# Patient Record
Sex: MALE | Race: WHITE | NOT HISPANIC OR LATINO | Employment: FULL TIME | URBAN - METROPOLITAN AREA
[De-identification: names, ages, dates, MRNs, and addresses within clinical notes are randomized per-mention and may not be internally consistent; named-entity substitution may affect disease eponyms.]

---

## 2022-01-21 ENCOUNTER — OFFICE VISIT (OUTPATIENT)
Dept: PRIMARY CARE CLINIC | Facility: CLINIC | Age: 42
End: 2022-01-21
Payer: COMMERCIAL

## 2022-01-21 VITALS
OXYGEN SATURATION: 98 % | BODY MASS INDEX: 22.51 KG/M2 | WEIGHT: 148.5 LBS | DIASTOLIC BLOOD PRESSURE: 68 MMHG | HEART RATE: 87 BPM | SYSTOLIC BLOOD PRESSURE: 118 MMHG | HEIGHT: 68 IN | RESPIRATION RATE: 18 BRPM | TEMPERATURE: 98 F

## 2022-01-21 DIAGNOSIS — Z11.4 ENCOUNTER FOR SCREENING FOR HIV: ICD-10-CM

## 2022-01-21 DIAGNOSIS — G89.29 CHRONIC RIGHT SHOULDER PAIN: ICD-10-CM

## 2022-01-21 DIAGNOSIS — Z76.89 ENCOUNTER TO ESTABLISH CARE: Primary | ICD-10-CM

## 2022-01-21 DIAGNOSIS — M25.511 CHRONIC RIGHT SHOULDER PAIN: ICD-10-CM

## 2022-01-21 DIAGNOSIS — Z13.6 ENCOUNTER FOR SCREENING FOR CARDIOVASCULAR DISORDERS: ICD-10-CM

## 2022-01-21 DIAGNOSIS — Z11.59 NEED FOR HEPATITIS C SCREENING TEST: ICD-10-CM

## 2022-01-21 PROCEDURE — 99999 PR PBB SHADOW E&M-NEW PATIENT-LVL IV: ICD-10-PCS | Mod: PBBFAC,,, | Performed by: STUDENT IN AN ORGANIZED HEALTH CARE EDUCATION/TRAINING PROGRAM

## 2022-01-21 PROCEDURE — 99999 PR PBB SHADOW E&M-NEW PATIENT-LVL IV: CPT | Mod: PBBFAC,,, | Performed by: STUDENT IN AN ORGANIZED HEALTH CARE EDUCATION/TRAINING PROGRAM

## 2022-01-21 PROCEDURE — 99214 OFFICE O/P EST MOD 30 MIN: CPT | Mod: S$GLB,,, | Performed by: STUDENT IN AN ORGANIZED HEALTH CARE EDUCATION/TRAINING PROGRAM

## 2022-01-21 PROCEDURE — 99214 PR OFFICE/OUTPT VISIT, EST, LEVL IV, 30-39 MIN: ICD-10-PCS | Mod: S$GLB,,, | Performed by: STUDENT IN AN ORGANIZED HEALTH CARE EDUCATION/TRAINING PROGRAM

## 2022-01-21 RX ORDER — DICLOFENAC SODIUM 10 MG/G
2 GEL TOPICAL 4 TIMES DAILY
Qty: 100 G | Refills: 5 | Status: SHIPPED | OUTPATIENT
Start: 2022-01-21 | End: 2023-10-12

## 2022-01-21 NOTE — PATIENT INSTRUCTIONS
"Patient Education       Shoulder Pain Discharge Instructions   About this topic   Your shoulder joint is made of 3 bones. These are the upper arm bone, the shoulder blade, and the collarbone. The shoulder is a "ball and socket" joint. The "ball" part of the joint is the top part of your upper arm bone. The "socket" part of your joint is a cup shaped indentation in your shoulder blade. Because of this, the shoulder can move in many ways. Strong bands of tissue called ligaments help hold the shoulder in place. Muscles and tendons also hold it in place.  You can have pain in your shoulder for many reasons. It may be hard for the doctor to tell exactly where the pain is coming from. You can have pain in your muscles, bones, or joints. It can also happen in your tendons and ligaments which connect these together.  Causes of this kind of pain may include:  · Overuse or using muscles in the same way over and over  · Trauma from falls, accidents, direct blows to muscles, and injuries such as bone breaks, sprains, or dislocations  · Strain on your muscles from bad posture           What care is needed at home?   · Ask your doctor what you need to do when you go home. Make sure you ask questions if you do not understand what the doctor says. This way you will know what you need to do.  · Rest. Allow your injury to heal before you do slow movements.  · Place an ice pack or a bag of frozen peas wrapped in a towel over the painful part. Never put ice right on the skin. Do not leave the ice on more than 10 to 15 minutes at a time.  · Prop your arm on pillows to help with swelling.  · Your doctor may want you to use a sling, strap, or sleeve to keep your shoulder from moving.  · Heat may be used but not right after an injury. Heat can make swelling worse. If your doctor tells you to use heat, put a heating pad on your shoulder for no more than 20 minutes at a time. Never go to sleep with a heating pad on as this can cause " burns.  · Do range of motion exercises as your therapist or doctor teaches you to do. As your shoulder heals, you will be given more exercises to stretch and strengthen your shoulder.  What follow-up care is needed?   · Your doctor may ask you to make visits to the office to check on your progress. Be sure to keep all these visits.  · Your doctor may send you to physical therapy or occupational therapy to help you regain use of your shoulder sooner.  What drugs may be needed?   The doctor may order drugs to:  · Help with pain and swelling  The doctor may give you a shot of an anti-inflammatory drug called a corticosteroid. This will help with swelling. Talk with your doctor about the risks of this shot.  Will physical activity be limited?   Your doctor may ask you to rest and limit your activity. Based on how bad your shoulder injury is, this could last for a few days to a number of weeks.  What can be done to prevent this health problem?   · Stay active and work out to keep your muscles strong and flexible.  · Warm up slowly and stretch your muscles before you work out. Do not work out if you are overly tired. Take extra care if working out in cold weather.  · Slowly increase the amount of time you work out. If you are using weights, slowly increase the weight to strengthen your muscles.  · Wear protection when playing sports.  · Take breaks often when doing things that use repeat movements.  When do I need to call the doctor?   · Pain or swelling gets worse  · Hand feels cold or numb  · You are not feeling better in 2 or 3 days or you are feeling worse  Teach Back: Helping You Understand   The Teach Back Method helps you understand the information we are giving you. After you talk with the staff, tell them in your own words what you learned. This helps to make sure the staff has described each thing clearly. It also helps to explain things that may have been confusing. Before going home, make sure you can do  these:  · I can tell you about my condition.  · I can tell you what may help ease my pain.  · I can tell you what I will do if I have more pain or swelling or my fingers are cool or blue.  Where can I learn more?   [employer]  http://orthoinfo.aaos.org/PDFs/C65298.pdf   Last Reviewed Date   2020-09-25  Consumer Information Use and Disclaimer   This information is not specific medical advice and does not replace information you receive from your health care provider. This is only a brief summary of general information. It does NOT include all information about conditions, illnesses, injuries, tests, procedures, treatments, therapies, discharge instructions or life-style choices that may apply to you. You must talk with your health care provider for complete information about your health and treatment options. This information should not be used to decide whether or not to accept your health care providers advice, instructions or recommendations. Only your health care provider has the knowledge and training to provide advice that is right for you.  Copyright   Copyright © 2021 [employer], [employer] and its affiliates and/or licensors. All rights reserved.

## 2022-01-21 NOTE — PROGRESS NOTES
Subjective:       Patient ID: Chris Bell is a 41 y.o. male.    Chief Complaint: Establish Care    HPI:  41 y.o. male presents to [facility] to establish care.    Acute concerns?: Right shoulder pain last 5-6 months. Pain waxes and wanes in intensity. Had some relief when off of work. Can depend on what he eats. Is a  and feels could have contributed. Onset was gradual. Pain is described as feeling internal. When lifting arm to a certain point pain can worsen. No limited mobility, just pain when moves past a certain point. Aching in nature. Tried voltaren with some relief. Hot shower will improve temporarily. Lifting heavy items can worsen. Limited to shoulder, doesn't radiate. Never had pain like this before. No past injury or surgery. First time being seen for this pain. Tried CBD oil which did help.    No history of MI, renal disease, or stomach ulcers    Last PCP?: Never had  Allergies: NKDA  Medical History: None  Medications: MVI  Surgical History: None   Family History: Father with prostatectomy for prostate cancer; no known autoimmune disease  Social History: Quit years ago, EtOH use on weekend 6 beers on a Saturday, no illicits    Fasting?: Yes  Hep C screening?: Amenable  HIV screening?: Amenable  COVID vaccine?: Didn't get it, not interested at this time, wants to get after 100% healthy, did have COVID in past  Last tetanus vaccine?: Doesn't recall, not today  Flu vaccine?: Not interested    Review of Systems   Constitutional: Negative for chills, diaphoresis, fatigue and fever.   HENT: Negative for congestion, sinus pressure, sneezing and sore throat.    Respiratory: Negative for cough and shortness of breath.    Cardiovascular: Negative for chest pain and palpitations.   Gastrointestinal: Negative for abdominal pain, diarrhea, nausea and vomiting.   Musculoskeletal: Positive for arthralgias (right shoulder). Negative for joint swelling and myalgias.   Skin: Negative for rash and wound.     "    Dry skin   Neurological: Negative for dizziness, weakness, numbness and headaches.       Objective:      Vitals:    01/21/22 0816   BP: 118/68   BP Location: Left arm   Patient Position: Sitting   BP Method: Medium (Manual)   Pulse: 87   Resp: 18   Temp: 98.1 °F (36.7 °C)   TempSrc: Oral   SpO2: 98%   Weight: 67.4 kg (148 lb 7.7 oz)   Height: 5' 8" (1.727 m)     Physical Exam  Vitals reviewed.   Constitutional:       General: He is not in acute distress.     Appearance: Normal appearance. He is not ill-appearing.   HENT:      Head: Normocephalic and atraumatic.   Eyes:      General:         Right eye: No discharge.         Left eye: No discharge.      Conjunctiva/sclera: Conjunctivae normal.   Cardiovascular:      Rate and Rhythm: Normal rate and regular rhythm.      Pulses: Normal pulses.      Heart sounds: Normal heart sounds.   Pulmonary:      Effort: Pulmonary effort is normal.      Breath sounds: Normal breath sounds.   Abdominal:      General: Abdomen is flat. Bowel sounds are normal. There is no distension.      Palpations: Abdomen is soft. There is no mass.      Tenderness: There is no abdominal tenderness. There is no guarding or rebound.   Musculoskeletal:         General: No deformity.   Skin:     General: Skin is warm and dry.      Coloration: Skin is not jaundiced.   Neurological:      General: No focal deficit present.      Mental Status: He is alert and oriented to person, place, and time.   Psychiatric:         Mood and Affect: Mood normal.         Behavior: Behavior normal.             No results found for: NA, K, CL, CO2, BUN, CREATININE, GLUCOSE, ANIONGAP  No results found for: HGBA1C  No results found for: BNP, BNPTRIAGEBLO    No results found for: WBC, HGB, HCT, PLT, GRAN  No results found for: CHOL, HDL, LDLCALC, TRIG       Current Outpatient Medications:     diclofenac sodium (VOLTAREN) 1 % Gel, Apply 2 g topically 4 (four) times daily., Disp: 100 g, Rfl: 5        Assessment:       1. " Encounter to establish care    2. Chronic right shoulder pain    3. Encounter for screening for cardiovascular disorders    4. Encounter for screening for HIV    5. Need for hepatitis C screening test           Plan:       Encounter to establish care    Chronic right shoulder pain  -     X-ray Shoulder 2 or More Views Right; Future; Expected date: 01/21/2022  -     Ambulatory referral/consult to Orthopedics; Future; Expected date: 01/28/2022  -     diclofenac sodium (VOLTAREN) 1 % Gel; Apply 2 g topically 4 (four) times daily.  Dispense: 100 g; Refill: 5  - Conservative care recommended, patient declines PT at this time and would like to wait on imaging results and to talk with Orthopaedics  - Amenable to discussing potential treatment options with Orthopaedics at this time following X-ray  - Will contact clinic if pain worsens or if desires PT referral in future  - Suspect deltoid involvement with pain worst with abduction past 90 degrees  - Will take ibuprofen OTC when needed    Encounter for screening for cardiovascular disorders  -     Lipid Panel; Future; Expected date: 01/21/2022    Encounter for screening for HIV  -     HIV 1/2 Ag/Ab (4th Gen); Future; Expected date: 01/21/2022    Need for hepatitis C screening test  -     Hepatitis C Antibody; Future; Expected date: 01/21/2022    RTC in 1 year  Contact clinic if further intervention on shoulder desired

## 2022-03-02 ENCOUNTER — TELEPHONE (OUTPATIENT)
Dept: ORTHOPEDICS | Facility: CLINIC | Age: 42
End: 2022-03-02
Payer: COMMERCIAL

## 2022-03-03 ENCOUNTER — TELEPHONE (OUTPATIENT)
Dept: ORTHOPEDICS | Facility: CLINIC | Age: 42
End: 2022-03-03
Payer: COMMERCIAL

## 2022-03-03 NOTE — TELEPHONE ENCOUNTER
----- Message from Johnny Hopkins sent at 3/3/2022  9:48 AM CST -----  Contact: Patient  Patient missed call. Requesting call back      [phone] ([place])

## 2022-03-03 NOTE — TELEPHONE ENCOUNTER
Spoke with pt. appt rescheduled due to Dr Abbasi being out of clinic. All questions answered. Pt verbalized understanding.

## 2023-10-12 ENCOUNTER — OFFICE VISIT (OUTPATIENT)
Dept: PRIMARY CARE CLINIC | Facility: CLINIC | Age: 43
End: 2023-10-12
Payer: COMMERCIAL

## 2023-10-12 VITALS
RESPIRATION RATE: 18 BRPM | HEIGHT: 68 IN | SYSTOLIC BLOOD PRESSURE: 118 MMHG | DIASTOLIC BLOOD PRESSURE: 80 MMHG | TEMPERATURE: 98 F | OXYGEN SATURATION: 100 % | HEART RATE: 90 BPM | WEIGHT: 150.25 LBS | BODY MASS INDEX: 22.77 KG/M2

## 2023-10-12 DIAGNOSIS — Z13.6 ENCOUNTER FOR SCREENING FOR CARDIOVASCULAR DISORDERS: ICD-10-CM

## 2023-10-12 DIAGNOSIS — M54.50 INTERMITTENT LOW BACK PAIN: ICD-10-CM

## 2023-10-12 DIAGNOSIS — Z51.81 MEDICATION MONITORING ENCOUNTER: ICD-10-CM

## 2023-10-12 DIAGNOSIS — J32.9 SINUSITIS, UNSPECIFIED CHRONICITY, UNSPECIFIED LOCATION: ICD-10-CM

## 2023-10-12 DIAGNOSIS — Z00.00 ANNUAL PHYSICAL EXAM: Primary | ICD-10-CM

## 2023-10-12 PROCEDURE — 99396 PREV VISIT EST AGE 40-64: CPT | Mod: S$GLB,,, | Performed by: STUDENT IN AN ORGANIZED HEALTH CARE EDUCATION/TRAINING PROGRAM

## 2023-10-12 PROCEDURE — 99396 PR PREVENTIVE VISIT,EST,40-64: ICD-10-PCS | Mod: S$GLB,,, | Performed by: STUDENT IN AN ORGANIZED HEALTH CARE EDUCATION/TRAINING PROGRAM

## 2023-10-12 PROCEDURE — 99999 PR PBB SHADOW E&M-EST. PATIENT-LVL IV: CPT | Mod: PBBFAC,,, | Performed by: STUDENT IN AN ORGANIZED HEALTH CARE EDUCATION/TRAINING PROGRAM

## 2023-10-12 PROCEDURE — 99999 PR PBB SHADOW E&M-EST. PATIENT-LVL IV: ICD-10-PCS | Mod: PBBFAC,,, | Performed by: STUDENT IN AN ORGANIZED HEALTH CARE EDUCATION/TRAINING PROGRAM

## 2023-10-12 RX ORDER — DOXYCYCLINE 100 MG/1
100 CAPSULE ORAL EVERY 12 HOURS
Qty: 20 CAPSULE | Refills: 0 | Status: SHIPPED | OUTPATIENT
Start: 2023-10-12 | End: 2023-10-22

## 2023-10-12 RX ORDER — FLUTICASONE PROPIONATE 50 MCG
2 SPRAY, SUSPENSION (ML) NASAL DAILY
Qty: 15.8 ML | Refills: 5 | Status: SHIPPED | OUTPATIENT
Start: 2023-10-12

## 2023-10-12 NOTE — PROGRESS NOTES
"Subjective:       Patient ID: Chris Bell is a 42 y.o. male.    Chief Complaint: Annual Exam    HPI:  42 y.o. male presents to [facility] for annual exam    Acute concerns?: patient reports had COVID 3 weeks ago and has been having sinus congestion with drainage since having infection. Was a home test. Is taking Zyrtec at this time which seems to be helping so.    Review of Systems   Constitutional:  Negative for chills, diaphoresis, fatigue and fever.   HENT:  Positive for congestion, postnasal drip and sinus pressure. Negative for sinus pain.    Respiratory:  Positive for cough (Mild). Negative for shortness of breath.    Cardiovascular:  Negative for chest pain and palpitations.   Gastrointestinal:  Negative for abdominal pain, diarrhea, nausea and vomiting.   Musculoskeletal:  Positive for back pain (Chronic low back pain intermittently. Not today. Declines intervention). Negative for myalgias.   Skin:  Negative for rash and wound.   Neurological:  Negative for dizziness, light-headedness and headaches.       Objective:      Vitals:    10/12/23 0825   BP: 118/80   BP Location: Left arm   Patient Position: Sitting   BP Method: Medium (Manual)   Pulse: 90   Resp: 18   Temp: 97.7 °F (36.5 °C)   TempSrc: Temporal   SpO2: 100%   Weight: 68.2 kg (150 lb 3.9 oz)   Height: 5' 8" (1.727 m)     Physical Exam  Vitals reviewed.   Constitutional:       General: He is not in acute distress.     Appearance: Normal appearance. He is not ill-appearing.   HENT:      Head: Normocephalic and atraumatic.      Nose:      Right Sinus: No maxillary sinus tenderness or frontal sinus tenderness.      Left Sinus: No maxillary sinus tenderness or frontal sinus tenderness.      Mouth/Throat:      Mouth: Mucous membranes are moist.      Pharynx: Oropharynx is clear. No oropharyngeal exudate or posterior oropharyngeal erythema.   Eyes:      General:         Right eye: No discharge.         Left eye: No discharge.      Conjunctiva/sclera: " "Conjunctivae normal.   Cardiovascular:      Rate and Rhythm: Normal rate and regular rhythm.      Pulses: Normal pulses.      Heart sounds: No murmur heard.  Pulmonary:      Effort: Pulmonary effort is normal.      Breath sounds: Normal breath sounds.   Musculoskeletal:         General: No deformity.      Cervical back: Neck supple. No rigidity.   Lymphadenopathy:      Cervical: No cervical adenopathy.   Skin:     General: Skin is warm and dry.      Coloration: Skin is not jaundiced.   Neurological:      General: No focal deficit present.      Mental Status: He is alert and oriented to person, place, and time.   Psychiatric:         Mood and Affect: Mood normal.         Behavior: Behavior normal.             No results found for: "NA", "K", "CL", "CO2", "BUN", "CREATININE", "GLUCOSE", "ANIONGAP"  No results found for: "HGBA1C"  No results found for: "BNP", "BNPTRIAGEBLO"    No results found for: "WBC", "HGB", "HCT", "PLT", "GRAN"  Lab Results   Component Value Date    CHOL 222 (H) 01/21/2022    HDL 74 01/21/2022    LDLCALC 125.6 01/21/2022    TRIG 112 01/21/2022          Current Outpatient Medications:     doxycycline (VIBRAMYCIN) 100 MG Cap, Take 1 capsule (100 mg total) by mouth every 12 (twelve) hours. for 10 days, Disp: 20 capsule, Rfl: 0    fluticasone propionate (FLONASE) 50 mcg/actuation nasal spray, 2 sprays (100 mcg total) by Each Nostril route once daily., Disp: 15.8 mL, Rfl: 5        Assessment:       1. Annual physical exam    2. Medication monitoring encounter    3. Sinusitis, unspecified chronicity, unspecified location    4. Intermittent low back pain    5. Encounter for screening for cardiovascular disorders           Plan:       Annual physical exam  Medication monitoring encounter  -     CBC Auto Differential; Future; Expected date: 10/12/2023  -     Comprehensive Metabolic Panel; Future; Expected date: 10/12/2023  - Health maintenance items reviewed today's visit    Sinusitis, unspecified " chronicity, unspecified location  -     doxycycline (VIBRAMYCIN) 100 MG Cap; Take 1 capsule (100 mg total) by mouth every 12 (twelve) hours. for 10 days  Dispense: 20 capsule; Refill: 0  -     fluticasone propionate (FLONASE) 50 mcg/actuation nasal spray; 2 sprays (100 mcg total) by Each Nostril route once daily.  Dispense: 15.8 mL; Refill: 5  - Will treat as possible concurrent bacterial sinusitis with persistence of symptoms at 3 weeks  - Continue conservative care measures    Intermittent low back pain  - Declines intervention today  - [facility] home spine conditioning program provided    Encounter for screening for cardiovascular disorders  -     Lipid Panel; Future; Expected date: 10/12/2023    RTC in 1 year

## 2023-10-18 ENCOUNTER — PATIENT MESSAGE (OUTPATIENT)
Dept: CARDIOLOGY | Facility: CLINIC | Age: 43
End: 2023-10-18
Payer: COMMERCIAL

## 2024-05-16 ENCOUNTER — OFFICE VISIT (OUTPATIENT)
Dept: PRIMARY CARE CLINIC | Facility: CLINIC | Age: 44
End: 2024-05-16
Payer: COMMERCIAL

## 2024-05-16 VITALS
OXYGEN SATURATION: 99 % | RESPIRATION RATE: 18 BRPM | WEIGHT: 154.56 LBS | HEART RATE: 76 BPM | DIASTOLIC BLOOD PRESSURE: 78 MMHG | BODY MASS INDEX: 23.43 KG/M2 | HEIGHT: 68 IN | SYSTOLIC BLOOD PRESSURE: 114 MMHG

## 2024-05-16 DIAGNOSIS — H66.91 RIGHT OTITIS MEDIA, UNSPECIFIED OTITIS MEDIA TYPE: ICD-10-CM

## 2024-05-16 DIAGNOSIS — G51.0 BELL'S PALSY: Primary | ICD-10-CM

## 2024-05-16 PROCEDURE — 99999 PR PBB SHADOW E&M-EST. PATIENT-LVL IV: CPT | Mod: PBBFAC,,, | Performed by: STUDENT IN AN ORGANIZED HEALTH CARE EDUCATION/TRAINING PROGRAM

## 2024-05-16 PROCEDURE — 99214 OFFICE O/P EST MOD 30 MIN: CPT | Mod: S$GLB,,, | Performed by: STUDENT IN AN ORGANIZED HEALTH CARE EDUCATION/TRAINING PROGRAM

## 2024-05-16 RX ORDER — PREDNISONE 20 MG/1
60 TABLET ORAL DAILY
Qty: 21 TABLET | Refills: 0 | Status: SHIPPED | OUTPATIENT
Start: 2024-05-16 | End: 2024-05-23

## 2024-05-16 RX ORDER — AMOXICILLIN AND CLAVULANATE POTASSIUM 875; 125 MG/1; MG/1
1 TABLET, FILM COATED ORAL EVERY 12 HOURS
Qty: 20 TABLET | Refills: 0 | Status: SHIPPED | OUTPATIENT
Start: 2024-05-16 | End: 2024-05-26

## 2024-05-16 RX ORDER — VALACYCLOVIR HYDROCHLORIDE 1 G/1
1000 TABLET, FILM COATED ORAL 3 TIMES DAILY
Qty: 21 TABLET | Refills: 0 | Status: SHIPPED | OUTPATIENT
Start: 2024-05-16 | End: 2024-05-23

## 2024-05-16 NOTE — PROGRESS NOTES
"Subjective:       Patient ID: Chris Bell is a 43 y.o. male.    Chief Complaint: [place] palsy      HPI:  43 y.o. male presents to [facility] with complaints of    Patient reports 5/6/2024 had long day of work and felt fatigued. Ate soup and fever went up to 102. Feels that right side of face doesn't have the same muscle control. Feels that he can't close his right eye all the way. No droop to that side, feels that muscle reactions are dampened. Can notice when eating. Reports concerns for Bell's Palsy. Has Uncle with Bell's Palsy. Patient doesn't get cold sores.    No ear pain or discharge. Feels that weakness in face gradually progressed.    Review of Systems   Constitutional:  Positive for fever. Negative for chills, diaphoresis and fatigue.   HENT:  Negative for congestion, sinus pressure, sneezing and sore throat.    Respiratory:  Negative for cough and shortness of breath.    Cardiovascular:  Negative for chest pain and palpitations.   Gastrointestinal:  Negative for abdominal pain, diarrhea, nausea and vomiting.   Skin:  Negative for rash and wound.   Neurological:  Positive for weakness (Right face). Negative for dizziness, speech difficulty, light-headedness, numbness and headaches.       Objective:      Vitals:    05/16/24 0952   BP: 114/78   BP Location: Left arm   Patient Position: Sitting   BP Method: Medium (Manual)   Pulse: 76   Resp: 18   SpO2: 99%   Weight: 70.1 kg (154 lb 8.7 oz)   Height: 5' 8" (1.727 m)     Physical Exam  Vitals reviewed.   Constitutional:       General: He is not in acute distress.     Appearance: Normal appearance. He is not ill-appearing.   HENT:      Head: Normocephalic and atraumatic.      Right Ear: Ear canal and external ear normal. There is no impacted cerumen.      Left Ear: Tympanic membrane, ear canal and external ear normal. There is no impacted cerumen.      Ears:      Comments: Mild opacity left TM     Mouth/Throat:      Mouth: Mucous membranes are moist.      " "Pharynx: Oropharynx is clear. No oropharyngeal exudate or posterior oropharyngeal erythema.   Eyes:      General:         Right eye: No discharge.         Left eye: No discharge.      Conjunctiva/sclera: Conjunctivae normal.   Cardiovascular:      Rate and Rhythm: Normal rate and regular rhythm.      Pulses: Normal pulses.      Heart sounds: Normal heart sounds. No murmur heard.  Pulmonary:      Effort: Pulmonary effort is normal.      Breath sounds: Normal breath sounds.   Musculoskeletal:         General: No deformity.      Cervical back: Neck supple. No rigidity.   Lymphadenopathy:      Cervical: No cervical adenopathy.   Skin:     General: Skin is warm and dry.      Coloration: Skin is not jaundiced.   Neurological:      General: No focal deficit present.      Mental Status: He is alert and oriented to person, place, and time.      GCS: GCS eye subscore is 4. GCS verbal subscore is 5. GCS motor subscore is 6.      Cranial Nerves: Facial asymmetry (Unable to raise right forhead equally to left. Right smile weaker and nasolabial fold diminished) present. No cranial nerve deficit.      Motor: Weakness (Right facial nerve) present. No tremor, atrophy, abnormal muscle tone, seizure activity or pronator drift.      Gait: Gait is intact. Gait normal.   Psychiatric:         Mood and Affect: Mood normal.         Behavior: Behavior normal.             Lab Results   Component Value Date     10/12/2023    K 4.3 10/12/2023     10/12/2023    CO2 24 10/12/2023    BUN 18 10/12/2023    CREATININE 1.1 10/12/2023    ANIONGAP 10 10/12/2023     No results found for: "HGBA1C"  No results found for: "BNP", "BNPTRIAGEBLO"    Lab Results   Component Value Date    WBC 5.15 10/12/2023    HGB 15.5 10/12/2023    HCT 46.1 10/12/2023     10/12/2023    GRAN 2.4 10/12/2023    GRAN 47.1 10/12/2023     Lab Results   Component Value Date    CHOL 220 (H) 10/12/2023    HDL 66 10/12/2023    LDLCALC 136.4 10/12/2023    TRIG 88 " 10/12/2023          Current Outpatient Medications:     amoxicillin-clavulanate 875-125mg (AUGMENTIN) 875-125 mg per tablet, Take 1 tablet by mouth every 12 (twelve) hours. for 10 days, Disp: 20 tablet, Rfl: 0    predniSONE (DELTASONE) 20 MG tablet, Take 3 tablets (60 mg total) by mouth once daily. for 7 days, Disp: 21 tablet, Rfl: 0    valACYclovir (VALTREX) 1000 MG tablet, Take 1 tablet (1,000 mg total) by mouth 3 (three) times daily. for 7 days, Disp: 21 tablet, Rfl: 0        Assessment:       1. Bell's palsy    2. Right otitis media, unspecified otitis media type           Plan:       Bell's palsy  -     valACYclovir (VALTREX) 1000 MG tablet; Take 1 tablet (1,000 mg total) by mouth 3 (three) times daily. for 7 days  Dispense: 21 tablet; Refill: 0  -     predniSONE (DELTASONE) 20 MG tablet; Take 3 tablets (60 mg total) by mouth once daily. for 7 days  Dispense: 21 tablet; Refill: 0  - Recommend eye paper taping at night  - If not improving following treatment consider Neurology referral    Right otitis media, unspecified otitis media type  -     amoxicillin-clavulanate 875-125mg (AUGMENTIN) 875-125 mg per tablet; Take 1 tablet by mouth every 12 (twelve) hours. for 10 days  Dispense: 20 tablet; Refill: 0    RTC PRN

## 2024-09-19 ENCOUNTER — PATIENT MESSAGE (OUTPATIENT)
Dept: PRIMARY CARE CLINIC | Facility: CLINIC | Age: 44
End: 2024-09-19
Payer: COMMERCIAL

## 2024-10-11 ENCOUNTER — OFFICE VISIT (OUTPATIENT)
Dept: PRIMARY CARE CLINIC | Facility: CLINIC | Age: 44
End: 2024-10-11
Payer: COMMERCIAL

## 2024-10-11 VITALS
HEIGHT: 68 IN | DIASTOLIC BLOOD PRESSURE: 76 MMHG | RESPIRATION RATE: 12 BRPM | WEIGHT: 149.25 LBS | TEMPERATURE: 97 F | OXYGEN SATURATION: 98 % | BODY MASS INDEX: 22.62 KG/M2 | SYSTOLIC BLOOD PRESSURE: 126 MMHG | HEART RATE: 73 BPM

## 2024-10-11 DIAGNOSIS — Z12.11 SCREENING FOR COLON CANCER: Primary | ICD-10-CM

## 2024-10-11 DIAGNOSIS — Z00.00 ANNUAL PHYSICAL EXAM: ICD-10-CM

## 2024-10-11 PROCEDURE — 99999 PR PBB SHADOW E&M-EST. PATIENT-LVL III: CPT | Mod: PBBFAC,,, | Performed by: NURSE PRACTITIONER

## 2024-10-11 NOTE — PROGRESS NOTES
Chief Complaint  Chief Complaint   Patient presents with    Annual Exam       HPI    HPI    History of Present Illness    CHIEF COMPLAINT:  Chris presents today for a routine checkup.    FAMILY HISTORY:  He reports a family history significant for cardiovascular disease and prostate cancer. His mother had a heart stent placed at age 53, and his maternal grandfather had multiple cardiac issues despite being a non-smoker. His father was diagnosed with prostate cancer in 2018, underwent prostatectomy, and is currently doing well.    MEDICAL HISTORY:  He reports a history of tinnitus, describing constant ringing in his ears. He believes his occupational noise exposure may have contributed to hearing damage. He denies being told of fluid in his ears.    SOCIAL HISTORY:  He reports alcohol consumption limited to weekends only. He denies daily alcohol use and smoking. He is physically active at work. At home, he performs sit-ups and push-ups and has some exercise equipment, but acknowledges the need to establish a more consistent exercise routine.    LABS:  His most recent labs from October 2023 showed normal blood count, kidney function, and liver function. His cholesterol was slightly elevated, with LDL at 136 mg/dL (marginally above the desired level of 130 mg/dL). However, his HDL cholesterol was noted to be high, which is considered protective.    PREVENTIVE CARE:  He has not received a flu vaccine and expresses no interest in receiving the flu vaccine today. Regarding colon cancer screening, he prefers the Cologuard test over colonoscopy, mentioning that his mother had informed him about it.      ROS:  General: -fever, -chills, -fatigue, -weight gain, -weight loss  Eyes: -vision changes, -redness, -discharge  ENT: -ear pain, -nasal congestion, -sore throat, +tinnitus  Cardiovascular: -chest pain, -palpitations, -lower extremity edema  Respiratory: -cough, -shortness of breath  Gastrointestinal: -abdominal pain,  "-nausea, -vomiting, -diarrhea, -constipation, -blood in stool  Genitourinary: -dysuria, -hematuria, -frequency  Musculoskeletal: -joint pain, -muscle pain  Skin: -rash, -lesion  Neurological: -headache, -dizziness, -numbness, -tingling  Psychiatric: -anxiety, -depression, -sleep difficulty         PAST MEDICAL HISTORY:  No past medical history on file.    PAST SURGICAL HISTORY:  No past surgical history on file.    SOCIAL HISTORY:  Social History     Socioeconomic History    Marital status: Single   Tobacco Use    Smoking status: Never    Smokeless tobacco: Never   Substance and Sexual Activity    Alcohol use: Not Currently    Drug use: Never       FAMILY HISTORY:  Family History   Family history unknown: Yes       ALLERGIES AND MEDICATIONS: updated and reviewed.  Review of patient's allergies indicates:  No Known Allergies  Current Outpatient Medications   Medication Sig Dispense Refill    valACYclovir (VALTREX) 1000 MG tablet Take 1 tablet (1,000 mg total) by mouth 3 (three) times daily. for 7 days 21 tablet 0     No current facility-administered medications for this visit.         ROS  Review of Systems        PHYSICAL EXAM  Vitals:    10/11/24 0842   BP: 126/76   Pulse: 73   Resp: 12   Temp: 96.6 °F (35.9 °C)   TempSrc: Temporal   SpO2: 98%   Weight: 67.7 kg (149 lb 4 oz)   Height: 5' 8" (1.727 m)    Body mass index is 22.69 kg/m².  Weight: 67.7 kg (149 lb 4 oz)   Height: 5' 8" (172.7 cm)     Physical Exam    Physical Exam    General: No acute distress. Well-developed. Well-nourished.  Eyes: EOMI. Sclerae anicteric.  HENT: Normocephalic. Atraumatic. Nares patent. Moist oral mucosa.  Ears: Bilateral TMs clear. Bilateral EACs clear.  Cardiovascular: Regular rate. Regular rhythm. No murmurs. No rubs. No gallops. Normal S1, S2. Normal heart rate.  Respiratory: Normal respiratory effort. Clear to auscultation bilaterally. No rales. No rhonchi. No wheezing. Clear lungs.  Abdomen: Soft. Non-tender. Non-distended. " Normoactive bowel sounds.  Musculoskeletal: No  obvious deformity.  Extremities: No lower extremity edema.  Neurological: Alert & oriented x3. No slurred speech. Normal gait.  Psychiatric: Normal mood. Normal affect. Good insight. Good judgment.  Skin: Warm. Dry. No rash.         Health Maintenance         Date Due Completion Date    Colorectal Cancer Screening Never done ---    TETANUS VACCINE 10/12/2024 (Originally 12/30/1996) ---    Influenza Vaccine (1) 06/30/2025 (Originally 9/1/2024) ---    COVID-19 Vaccine (1 - 2024-25 season) 10/11/2025 (Originally 9/1/2024) ---    Lipid Panel 10/12/2028 10/12/2023    RSV Vaccine (Age 60+ and Pregnant patients) (1 - 1-dose 75+ series) 12/30/2053 ---              Assessment & Plan    Problem List Items Addressed This Visit    None  Visit Diagnoses       Screening for colon cancer    -  Primary    Relevant Orders    Cologuard Screening (Multitarget Stool DNA)    Annual physical exam        Relevant Orders    CBC Auto Differential    Comprehensive Metabolic Panel    Lipid Panel    TSH            Assessment & Plan    Assessed cholesterol levels: LDL slightly elevated at 136 (target <130), HDL protective and elevated  Considered family history of cardiac issues: mother's stent placement at age 53, grandfather's heart problems  Evaluated need for colon cancer screening at age 43  Noted father's history of prostate cancer, diagnosed around age 65 in 2018  Considered tinnitus complaint, likely due to occupational noise exposure    HYPERLIPIDEMIA:  - Explained current approach to cholesterol assessment, focusing on LDL and HDL components rather than total cholesterol.    COLON CANCER SCREENING:  - Discussed colon cancer screening recommendations starting at age 45.  - Provided information on colonoscopy procedure and Cologuard stool test as screening options.  - Cologuard stool test ordered for colon cancer screening.    PROSTATE CANCER SCREENING:  - Explained prostate cancer  screening starting at age 50 with blood test.    DIZZINESS AND TINNITUS:  - Discussed existence of dizziness and tinnitus clinics for potential treatment options.  - Contact the office if tinnitus symptoms worsen to consider follow-up with Dr. Gaston in [place] for management.    EXERCISE RECOMMENDATIONS:  - Chris to continue current exercise routine, including sit-ups, push-ups, and use of home equipment.  - Recommend making exercise a more consistent routine.    LABS:  - Blood work ordered, including thyroid function test.    FOLLOW UP:  - Follow up in 1 year for annual physical.         Follow-up: Follow up in about 1 year (around 10/11/2025).    Daisy Mcocllum       This note was generated with the assistance of ambient listening technology. Verbal consent was obtained by the patient and accompanying visitor(s) for the recording of patient appointment to facilitate this note. I attest to having reviewed and edited the generated note for accuracy, though some syntax or spelling errors may persist. Please contact the author of this note for any clarification.      Medication List with Changes/Refills   Current Medications    VALACYCLOVIR (VALTREX) 1000 MG TABLET    Take 1 tablet (1,000 mg total) by mouth 3 (three) times daily. for 7 days

## 2025-08-25 ENCOUNTER — PATIENT OUTREACH (OUTPATIENT)
Dept: ADMINISTRATIVE | Facility: HOSPITAL | Age: 45
End: 2025-08-25
Payer: COMMERCIAL